# Patient Record
Sex: MALE | URBAN - METROPOLITAN AREA
[De-identification: names, ages, dates, MRNs, and addresses within clinical notes are randomized per-mention and may not be internally consistent; named-entity substitution may affect disease eponyms.]

---

## 2019-02-25 ENCOUNTER — OFFICE VISIT (OUTPATIENT)
Dept: URBAN - METROPOLITAN AREA CLINIC 17 | Facility: CLINIC | Age: 69
End: 2019-02-25
Payer: COMMERCIAL

## 2019-02-25 DIAGNOSIS — H40.1134 PRIMARY OPEN-ANGLE GLAUCOMA, BILATERAL, INDETERMINATE STAGE: Primary | ICD-10-CM

## 2019-02-25 PROCEDURE — 76514 ECHO EXAM OF EYE THICKNESS: CPT | Performed by: OPHTHALMOLOGY

## 2019-02-25 PROCEDURE — 99204 OFFICE O/P NEW MOD 45 MIN: CPT | Performed by: OPHTHALMOLOGY

## 2019-02-25 PROCEDURE — 92133 CPTRZD OPH DX IMG PST SGM ON: CPT | Performed by: OPHTHALMOLOGY

## 2019-02-25 PROCEDURE — 92020 GONIOSCOPY: CPT | Performed by: OPHTHALMOLOGY

## 2019-02-25 ASSESSMENT — INTRAOCULAR PRESSURE
OD: 30
OS: 25

## 2019-02-25 NOTE — IMPRESSION/PLAN
Impression: Primary open-angle glaucoma, bilateral, indeterminate stage: H40.1134. Condition: unstable. IOP elevated OU Plan: Discussed diagnosis, explained and understood by patient. Discussed IOP/ONH/Glaucoma management and risks. start Vyzulta QHS OD and start Lumigan QHS OS (Samples given today). Discussed possibility of laser and surgery to lower IOP if drops do not work. Will continue to monitor condition and symptoms.

## 2019-04-08 ENCOUNTER — OFFICE VISIT (OUTPATIENT)
Dept: URBAN - METROPOLITAN AREA CLINIC 17 | Facility: CLINIC | Age: 69
End: 2019-04-08
Payer: COMMERCIAL

## 2019-04-08 PROCEDURE — 92083 EXTENDED VISUAL FIELD XM: CPT | Performed by: OPHTHALMOLOGY

## 2019-04-08 PROCEDURE — 99213 OFFICE O/P EST LOW 20 MIN: CPT | Performed by: OPHTHALMOLOGY

## 2019-04-08 RX ORDER — LATANOPROSTENE BUNOD 0.24 MG/ML
0.024 % SOLUTION/ DROPS OPHTHALMIC
Qty: 1 | Refills: 10 | Status: INACTIVE
Start: 2019-04-08 | End: 2020-01-06

## 2019-04-08 RX ORDER — BIMATOPROST 0.1 MG/ML
0.01 % SOLUTION/ DROPS OPHTHALMIC
Qty: 0 | Refills: 0 | Status: INACTIVE
Start: 2019-02-25 | End: 2019-04-08

## 2019-04-08 RX ORDER — LATANOPROSTENE BUNOD 0.24 MG/ML
0.024 % SOLUTION/ DROPS OPHTHALMIC
Qty: 0 | Refills: 0 | Status: INACTIVE
Start: 2019-02-25 | End: 2019-04-08

## 2019-04-08 ASSESSMENT — KERATOMETRY
OD: 40.63
OS: 40.50

## 2019-04-08 ASSESSMENT — INTRAOCULAR PRESSURE
OS: 15
OD: 15

## 2019-04-08 NOTE — IMPRESSION/PLAN
Impression: Primary open-angle glaucoma, bilateral, moderate stage: M37.0802. OU.
-IOP doing well ou with glaucoma meds. --ONH stable ou - Plan: Discussed diagnosis, explained and understood by patient. Discussed IOP/ONH/Glaucoma management and risks. visual field ordered and reviewed today. start vyzulta OS qhs. continue vyzulta OD qhs.
discontinue lumigan OS. Will continue to monitor condition and symptoms.

## 2019-08-05 ENCOUNTER — OFFICE VISIT (OUTPATIENT)
Dept: URBAN - METROPOLITAN AREA CLINIC 17 | Facility: CLINIC | Age: 69
End: 2019-08-05
Payer: COMMERCIAL

## 2019-08-05 PROCEDURE — 99213 OFFICE O/P EST LOW 20 MIN: CPT | Performed by: OPHTHALMOLOGY

## 2019-08-05 ASSESSMENT — INTRAOCULAR PRESSURE
OS: 16
OD: 18

## 2019-12-02 ENCOUNTER — OFFICE VISIT (OUTPATIENT)
Dept: URBAN - METROPOLITAN AREA CLINIC 17 | Facility: CLINIC | Age: 69
End: 2019-12-02
Payer: COMMERCIAL

## 2019-12-02 PROCEDURE — 92133 CPTRZD OPH DX IMG PST SGM ON: CPT | Performed by: OPHTHALMOLOGY

## 2019-12-02 PROCEDURE — 99213 OFFICE O/P EST LOW 20 MIN: CPT | Performed by: OPHTHALMOLOGY

## 2019-12-02 RX ORDER — PREDNISOLONE ACETATE 10 MG/ML
1 % SUSPENSION/ DROPS OPHTHALMIC
Qty: 5 | Refills: 0 | Status: INACTIVE
Start: 2019-12-02 | End: 2019-12-02

## 2019-12-02 ASSESSMENT — INTRAOCULAR PRESSURE
OD: 20
OS: 18

## 2019-12-02 NOTE — IMPRESSION/PLAN
Impression: Primary open-angle glaucoma, bilateral, moderate stage: O90.6434. OU.
CCT thin OU ALT OD 8/20/14 ONH stable OU
IOP elevated OU today Plan: Discussed diagnosis, explained and understood by patient. Discussed IOP/ONH/Glaucoma management and risks. OCT ordered and reviewed today. Continue vyzulta ou qhs. Continue to monitor condition and symptoms. Recommend Trabeculoplasty (SLT)  to possibly lower IOP. Patient elects SLT OD. Discussed RBA's of laser trabeculoplasty .  RL=2

## 2019-12-30 ENCOUNTER — SURGERY (OUTPATIENT)
Dept: URBAN - METROPOLITAN AREA SURGERY 7 | Facility: SURGERY | Age: 69
End: 2019-12-30
Payer: COMMERCIAL

## 2019-12-30 PROCEDURE — 65855 TRABECULOPLASTY LASER SURG: CPT | Performed by: OPHTHALMOLOGY

## 2020-01-06 ENCOUNTER — POST-OPERATIVE VISIT (OUTPATIENT)
Dept: URBAN - METROPOLITAN AREA CLINIC 17 | Facility: CLINIC | Age: 70
End: 2020-01-06

## 2020-01-06 DIAGNOSIS — Z09 ENCNTR FOR F/U EXAM AFT TRTMT FOR COND OTH THAN MALIG NEOPLM: Primary | ICD-10-CM

## 2020-01-06 PROCEDURE — 99024 POSTOP FOLLOW-UP VISIT: CPT | Performed by: OPTOMETRIST

## 2020-01-06 RX ORDER — LATANOPROSTENE BUNOD 0.24 MG/ML
0.024 % SOLUTION/ DROPS OPHTHALMIC
Qty: 1 | Refills: 10 | Status: INACTIVE
Start: 2020-01-06 | End: 2020-05-04

## 2020-01-06 ASSESSMENT — INTRAOCULAR PRESSURE
OD: 21
OD: 25
OS: 20

## 2020-03-06 ENCOUNTER — OFFICE VISIT (OUTPATIENT)
Dept: URBAN - METROPOLITAN AREA CLINIC 17 | Facility: CLINIC | Age: 70
End: 2020-03-06
Payer: MEDICARE

## 2020-03-06 PROCEDURE — 92020 GONIOSCOPY: CPT | Performed by: OPHTHALMOLOGY

## 2020-03-06 PROCEDURE — 92014 COMPRE OPH EXAM EST PT 1/>: CPT | Performed by: OPHTHALMOLOGY

## 2020-03-06 ASSESSMENT — INTRAOCULAR PRESSURE
OS: 22
OD: 21

## 2020-03-06 NOTE — IMPRESSION/PLAN
Impression: Primary open-angle glaucoma, bilateral, moderate stage: O54.3131. OU.
ALT OD 8/20/14 Plan: Pt has Glaucoma    Gonio :Open to SS 1+ PG OU         Pachs:486/480      Today's IOP :21/22     Tmax & date :32/29 Target IOP low to mid teens Pt denies Fhx of Glaucoma Right / Left eye is the better seeing eye Last vf OD inferior Step OS Nasal step 4/8/19 C/D: 
OCT:50/52 12/2/19 Pt denies Sulfa Allergy   // Pt denies Lung /Heart dx Pt is currently using :Vyzulta QHS OU, No previous medications used  / Previously used medications : 
Plan :
1. IOP high today. Patient reports difficulty getting medications due to cost. 
2. Poor SLT effect 3 Will change patients medications START Latanoprost QHS OU Dorzolamide BID OU 4. Patient to return in 6-8 weeks for IOP check

## 2020-05-04 ENCOUNTER — OFFICE VISIT (OUTPATIENT)
Dept: URBAN - METROPOLITAN AREA CLINIC 17 | Facility: CLINIC | Age: 70
End: 2020-05-04
Payer: MEDICARE

## 2020-05-04 PROCEDURE — 92012 INTRM OPH EXAM EST PATIENT: CPT | Performed by: OPHTHALMOLOGY

## 2020-05-04 ASSESSMENT — INTRAOCULAR PRESSURE
OS: 18
OD: 18

## 2020-09-21 ENCOUNTER — OFFICE VISIT (OUTPATIENT)
Dept: URBAN - METROPOLITAN AREA CLINIC 17 | Facility: CLINIC | Age: 70
End: 2020-09-21
Payer: MEDICARE

## 2020-09-21 PROCEDURE — 92083 EXTENDED VISUAL FIELD XM: CPT | Performed by: OPHTHALMOLOGY

## 2020-09-21 PROCEDURE — 92012 INTRM OPH EXAM EST PATIENT: CPT | Performed by: OPHTHALMOLOGY

## 2020-09-21 RX ORDER — DORZOLAMIDE HCL 20 MG/ML
2 % SOLUTION/ DROPS OPHTHALMIC
Qty: 3 | Refills: 3 | Status: INACTIVE
Start: 2020-09-21 | End: 2021-10-13

## 2020-09-21 RX ORDER — LATANOPROST 50 UG/ML
0.005 % SOLUTION OPHTHALMIC
Qty: 3 | Refills: 3 | Status: INACTIVE
Start: 2020-09-21 | End: 2021-05-07

## 2020-09-21 ASSESSMENT — INTRAOCULAR PRESSURE
OD: 18
OS: 15

## 2020-09-21 NOTE — IMPRESSION/PLAN
Impression: Primary open-angle glaucoma, bilateral, moderate stage: T32.3865. OU.
ALT OD 8/20/14 Poor SLT effect Plan: Pt has Moderate POAG OU Gonio :Open to SS 1+ PG OU         Pachs:486/480      Today's IOP : 18/15    Tmax & date :32/29 Target IOP low to mid teens Pt denies Fhx of Glaucoma Left eye is the better seeing eye [facility] 09/18/2020 OD: Early nasal step  OS: Central scotoma C/D: .8-.8 deep cup / .6-.6 OCT:50/52 12/2/19 Pt denies Sulfa Allergy   // Pt denies Lung /Heart dx Pt is currently using :Latanoprost QHS OU, Dorzolamide BID OU  (Drops not used today) No previous medications used Plan :
1. Cont:
Latanoprost QHS OU Dorzolamide BID OU 2. Discussed details about Glaucoma and that without proper control of pressures irreversible blindness can occur. Patient understands risks. Emphasize compliance with drop and without compliance vision loss progression can occur.

## 2021-03-08 ENCOUNTER — OFFICE VISIT (OUTPATIENT)
Dept: URBAN - METROPOLITAN AREA CLINIC 17 | Facility: CLINIC | Age: 71
End: 2021-03-08
Payer: MEDICARE

## 2021-03-08 PROCEDURE — 99213 OFFICE O/P EST LOW 20 MIN: CPT | Performed by: OPHTHALMOLOGY

## 2021-03-08 ASSESSMENT — INTRAOCULAR PRESSURE
OS: 24
OD: 28

## 2021-03-08 NOTE — IMPRESSION/PLAN
Impression: Primary open-angle glaucoma, bilateral, moderate stage: X01.6507. OU.
ALT OD 8/20/14 Poor SLT effect Plan: Pt has Moderate POAG OU Gonio :Open to SS 1+ PG OU         Pachs:486/480      Today's IOP : 28/24 (Ran out of drops x 2 weeks)    Tmax & date :32/29 Target IOP low to mid teens Pt denies Fhx of Glaucoma Left eye is the better seeing eye [facility] 09/18/2020 OD: Early nasal step  OS: Central scotoma C/D: .8-.8 deep cup / .6-.7 OCT:50/52 12/2/19 Pt denies Sulfa Allergy   // Pt denies Lung /Heart dx Pt is currently using :Latanoprost QHS OU, Dorzolamide BID OU  (Drops not used today) No previous medications used Plan :
1. Cont: (ran out x 2 weeks) Latanoprost QHS OU Dorzolamide BID OU 2. Discussed details about Glaucoma and that without proper control of pressures irreversible blindness can occur. Patient understands risks. Emphasize compliance with drop and without compliance vision loss progression can occur. 
3. RTC 2 months IOP (with Dr. Justyn Vázquez or Gautam Bonilla)

## 2021-05-03 ENCOUNTER — OFFICE VISIT (OUTPATIENT)
Dept: URBAN - METROPOLITAN AREA CLINIC 17 | Facility: CLINIC | Age: 71
End: 2021-05-03
Payer: MEDICARE

## 2021-05-03 PROCEDURE — 99214 OFFICE O/P EST MOD 30 MIN: CPT | Performed by: OPHTHALMOLOGY

## 2021-05-03 ASSESSMENT — INTRAOCULAR PRESSURE
OD: 27
OS: 19

## 2021-05-03 NOTE — IMPRESSION/PLAN
Impression: Primary open-angle glaucoma, bilateral, moderate stage: X02.2799. OU.
ALT OD 8/20/14 Poor SLT effect Plan: Discussed diagnosis, explained and understood by patient. Discussed IOP/ONH/Glaucoma management and risks. Advised pt IOPs are still elevated and need to get IOP down. IF IOPs do not improve would need to consider Laser or surgery. Start rocklatan QHS OD and Lumigan QHS OS. Continue Dorzolamide BID OU. D/C Latanoprost. Will continue to monitor condition and symptoms.

## 2021-05-07 RX ORDER — NETARSUDIL AND LATANOPROST OPHTHALMIC SOLUTION, 0.02%/0.005% .2; .05 MG/ML; MG/ML
SOLUTION/ DROPS OPHTHALMIC; TOPICAL
Qty: 2.5 | Refills: 11 | Status: INACTIVE
Start: 2021-05-03 | End: 2021-06-14

## 2021-06-14 ENCOUNTER — OFFICE VISIT (OUTPATIENT)
Dept: URBAN - METROPOLITAN AREA CLINIC 17 | Facility: CLINIC | Age: 71
End: 2021-06-14
Payer: MEDICARE

## 2021-06-14 PROCEDURE — 99213 OFFICE O/P EST LOW 20 MIN: CPT | Performed by: OPHTHALMOLOGY

## 2021-06-14 RX ORDER — NETARSUDIL AND LATANOPROST OPHTHALMIC SOLUTION, 0.02%/0.005% .2; .05 MG/ML; MG/ML
SOLUTION/ DROPS OPHTHALMIC; TOPICAL
Qty: 2.5 | Refills: 11 | Status: INACTIVE
Start: 2021-06-14 | End: 2021-12-08

## 2021-06-14 ASSESSMENT — INTRAOCULAR PRESSURE
OD: 21
OS: 19

## 2021-06-14 NOTE — IMPRESSION/PLAN
Impression: Age-related nuclear cataract, bilateral: H25.13. Plan: Cataracts account for the patient's complaints. Discussed all risks, benefits, procedures and recovery. Patient understands changing glasses will not improve vision. Patient desires to have surgery. Recommend cataract/migs.

## 2021-06-14 NOTE — IMPRESSION/PLAN
Impression: Primary open-angle glaucoma, bilateral, moderate stage: S29.8962. OU.
CCT thin OU ALT OD 8/20/14 Poor SLT effect Plan: Discussed diagnosis, explained and understood by patient. Discussed IOP/ONH/Glaucoma management and risks. Continue rocklatan QHS OD and Lumigan QHS OS. Continue Dorzolamide BID OU. Will continue to monitor condition and symptoms.

## 2021-07-12 ENCOUNTER — OFFICE VISIT (OUTPATIENT)
Dept: URBAN - METROPOLITAN AREA CLINIC 17 | Facility: CLINIC | Age: 71
End: 2021-07-12
Payer: MEDICARE

## 2021-07-12 DIAGNOSIS — H40.1132 PRIMARY OPEN-ANGLE GLAUCOMA, BILATERAL, MODERATE STAGE: ICD-10-CM

## 2021-07-12 DIAGNOSIS — H25.813 COMBINED FORMS OF AGE-RELATED CATARACT, BILATERAL: Primary | ICD-10-CM

## 2021-07-12 DIAGNOSIS — H25.812 COMBINED FORMS OF AGE-RELATED CATARACT, LEFT EYE: ICD-10-CM

## 2021-07-12 DIAGNOSIS — H35.372 PUCKERING OF MACULA, LEFT EYE: ICD-10-CM

## 2021-07-12 PROCEDURE — 99214 OFFICE O/P EST MOD 30 MIN: CPT | Performed by: OPHTHALMOLOGY

## 2021-07-12 ASSESSMENT — KERATOMETRY
OD: 40.88
OS: 40.50

## 2021-07-12 ASSESSMENT — VISUAL ACUITY
OD: 20/50
OS: 20/40

## 2021-07-12 ASSESSMENT — INTRAOCULAR PRESSURE
OS: 11
OD: 13

## 2021-07-12 NOTE — IMPRESSION/PLAN
Impression: Combined forms of age-related cataract, bilateral: H25.813. Condition: established, worsening. Symptoms: could improve with surgery. Plan: Cataract accounts for patient's complaints. Reviewed risks, benefits, and procedure. Patient desires surgery, schedule ce/iol w istent inject OD then OS, RL2, standard IOL, distance refractive target, patient is clear for surgery in [place] [id number]. Discussed with patient the possible need for second surgery due to history of LASIK. Recommend Suzy in [place] to help prevent secondary surgery.

## 2021-07-12 NOTE — IMPRESSION/PLAN
Impression: Primary open-angle glaucoma, bilateral, moderate stage: M86.9622. OU.
CCT thin OU ALT OD 8/20/14 Poor SLT effect Plan: Discussed diagnosis, explained and understood by patient. Discussed IStent inject at the time of cataract surgery to help maintain IOPs. Continue rocklatan QHS OD and Lumigan QHS OS. Continue Dorzolamide BID OU. Will continue to monitor condition and symptoms.

## 2021-07-12 NOTE — IMPRESSION/PLAN
Impression: mild Puckering of macula, left eye: H35.372. Plan: Discussed diagnosis in detail with patient. No treatment is required at this time. Will continue to observe condition and or symptoms.

## 2021-07-29 ENCOUNTER — TESTING ONLY (OUTPATIENT)
Dept: URBAN - METROPOLITAN AREA CLINIC 23 | Facility: CLINIC | Age: 71
End: 2021-07-29
Payer: MEDICARE

## 2021-07-29 DIAGNOSIS — H25.13 AGE-RELATED NUCLEAR CATARACT, BILATERAL: Primary | ICD-10-CM

## 2021-07-29 ASSESSMENT — PACHYMETRY
OD: 3.80
OD: 26.39
OS: 3.60
OS: 26.23

## 2021-08-10 ENCOUNTER — SURGERY (OUTPATIENT)
Dept: URBAN - METROPOLITAN AREA SURGERY 7 | Facility: SURGERY | Age: 71
End: 2021-08-10
Payer: MEDICARE

## 2021-08-10 PROCEDURE — 0191T INSERTION OF ANTERIOR SEGMENT AQUEOUS DRAINAGE DEVICE, W/OUT EXTRAOCULAR RESERVO: CPT | Performed by: OPHTHALMOLOGY

## 2021-08-10 PROCEDURE — 66984 XCAPSL CTRC RMVL W/O ECP: CPT | Performed by: OPHTHALMOLOGY

## 2021-08-11 ENCOUNTER — POST-OPERATIVE VISIT (OUTPATIENT)
Dept: URBAN - METROPOLITAN AREA CLINIC 17 | Facility: CLINIC | Age: 71
End: 2021-08-11
Payer: MEDICARE

## 2021-08-11 DIAGNOSIS — Z48.810 ENCOUNTER FOR SURGICAL AFTERCARE FOLLOWING SURGERY ON A SENSE ORGAN: Primary | ICD-10-CM

## 2021-08-11 PROCEDURE — 99024 POSTOP FOLLOW-UP VISIT: CPT | Performed by: OPTOMETRIST

## 2021-08-11 ASSESSMENT — INTRAOCULAR PRESSURE
OS: 22
OD: 41

## 2021-08-11 NOTE — IMPRESSION/PLAN
Impression: S/P Cataract Extraction by phacoemulsification with IOL placement [id number]; iStent Inject 0191T 0376T OD - 1 Day. Encounter for surgical aftercare following surgery on a sense organ  Z48.810. Post operative instructions reviewed - Plan: Return in 1wk --Continue all glauc meds as directed: Latanoprost QHS OS, Rocklatan QHS OD, Dorzol BID OU.  Advised pt importance of compliance w/gtts

## 2021-08-18 ENCOUNTER — POST-OPERATIVE VISIT (OUTPATIENT)
Dept: URBAN - METROPOLITAN AREA CLINIC 17 | Facility: CLINIC | Age: 71
End: 2021-08-18
Payer: MEDICARE

## 2021-08-18 PROCEDURE — 99024 POSTOP FOLLOW-UP VISIT: CPT | Performed by: OPTOMETRIST

## 2021-08-18 ASSESSMENT — INTRAOCULAR PRESSURE
OS: 20
OD: 19

## 2021-08-18 NOTE — IMPRESSION/PLAN
Impression: S/P Cataract Extraction by phacoemulsification with IOL placement [id number]; iStent Inject 0191T 0376T OD - 8 Days. Encounter for surgical aftercare following surgery on a sense organ  Z48.810. Plan: 2nd eye orders --Advised patient to use artificial tears for comfort. --Continue all meds

## 2021-08-23 ENCOUNTER — SURGERY (OUTPATIENT)
Dept: URBAN - METROPOLITAN AREA SURGERY 7 | Facility: SURGERY | Age: 71
End: 2021-08-23
Payer: MEDICARE

## 2021-08-23 PROCEDURE — 66984 XCAPSL CTRC RMVL W/O ECP: CPT | Performed by: OPHTHALMOLOGY

## 2021-08-23 PROCEDURE — 0191T INSERTION OF ANTERIOR SEGMENT AQUEOUS DRAINAGE DEVICE, W/OUT EXTRAOCULAR RESERVO: CPT | Performed by: OPHTHALMOLOGY

## 2021-08-24 ENCOUNTER — POST-OPERATIVE VISIT (OUTPATIENT)
Dept: URBAN - METROPOLITAN AREA CLINIC 17 | Facility: CLINIC | Age: 71
End: 2021-08-24
Payer: MEDICARE

## 2021-08-24 PROCEDURE — 99024 POSTOP FOLLOW-UP VISIT: CPT | Performed by: OPTOMETRIST

## 2021-08-24 ASSESSMENT — INTRAOCULAR PRESSURE
OS: 18
OD: 15

## 2021-08-24 NOTE — IMPRESSION/PLAN
Impression: S/P Cataract Extraction by phacoemulsification/IOL placement/iStent placement; Shunt:  iStent Inject 0191T  0376T OS - 1 Day. Encounter for surgical aftercare following surgery on a sense organ  Z48.810. Plan: 1WK PO2 --Advised patient to use artificial tears for comfort. --Continue all Glaucoma medications. Continue Rocklatan QHS OD, Lumigan QHS OS, Dorzolamide BID OU. 
1 Sample of Rocklatan and Lumigan given.

## 2021-08-31 ENCOUNTER — POST-OPERATIVE VISIT (OUTPATIENT)
Dept: URBAN - METROPOLITAN AREA CLINIC 17 | Facility: CLINIC | Age: 71
End: 2021-08-31
Payer: MEDICARE

## 2021-08-31 DIAGNOSIS — Z96.1 PRESENCE OF INTRAOCULAR LENS: Primary | ICD-10-CM

## 2021-08-31 PROCEDURE — 99024 POSTOP FOLLOW-UP VISIT: CPT | Performed by: OPTOMETRIST

## 2021-08-31 ASSESSMENT — INTRAOCULAR PRESSURE
OD: 11
OS: 9

## 2021-08-31 NOTE — IMPRESSION/PLAN
Impression: S/P Cataract Extraction by phacoemulsification/IOL placement/iStent placement; Shunt:  iStent Inject 0191T  0376T OS - 8 Days. Presence of intraocular lens  Z96.1. Post operative instructions reviewed - Condition is improving - Plan: return in 3 weeks --Continue all glauc meds: dorzol bid ou, lum qhs os, rockl qhs od. --Advised patient to use artificial tears for comfort.

## 2021-09-22 ENCOUNTER — POST-OPERATIVE VISIT (OUTPATIENT)
Dept: URBAN - METROPOLITAN AREA CLINIC 17 | Facility: CLINIC | Age: 71
End: 2021-09-22
Payer: MEDICARE

## 2021-09-22 PROCEDURE — 99024 POSTOP FOLLOW-UP VISIT: CPT | Performed by: OPTOMETRIST

## 2021-09-22 RX ORDER — PREDNISOLONE ACETATE 10 MG/ML
1 % SUSPENSION/ DROPS OPHTHALMIC
Qty: 5 | Refills: 1 | Status: INACTIVE
Start: 2021-09-22 | End: 2021-10-13

## 2021-09-22 ASSESSMENT — INTRAOCULAR PRESSURE
OD: 12
OS: 9

## 2021-09-22 ASSESSMENT — VISUAL ACUITY
OS: 20/25
OD: 20/25

## 2021-09-22 NOTE — IMPRESSION/PLAN
Impression: S/P Cataract Extraction by phacoemulsification/IOL placement/iStent placement; Shunt:  iStent Inject 0191T  0376T OS - 30 Days. Presence of intraocular lens  Z96.1. Plan: Return for 3 months IOP check with Dr. Taz Paulino --Advised patient to use artificial tears for comfort. 
--Continue Dorzolamide BID OU, Lumigan QHS OS, Rocklatan QHS OD
--Start Pred-Ace QID OD x 1 week (ERx'd)

## 2021-10-13 ENCOUNTER — OFFICE VISIT (OUTPATIENT)
Dept: URBAN - METROPOLITAN AREA CLINIC 17 | Facility: CLINIC | Age: 71
End: 2021-10-13
Payer: MEDICARE

## 2021-10-13 PROCEDURE — 92133 CPTRZD OPH DX IMG PST SGM ON: CPT | Performed by: OPHTHALMOLOGY

## 2021-10-13 PROCEDURE — 99214 OFFICE O/P EST MOD 30 MIN: CPT | Performed by: OPHTHALMOLOGY

## 2021-10-13 PROCEDURE — 92083 EXTENDED VISUAL FIELD XM: CPT | Performed by: OPHTHALMOLOGY

## 2021-10-13 RX ORDER — DORZOLAMIDE HCL 20 MG/ML
2 % SOLUTION/ DROPS OPHTHALMIC
Qty: 7.5 | Refills: 11 | Status: INACTIVE
Start: 2021-10-13 | End: 2021-12-08

## 2021-10-13 ASSESSMENT — INTRAOCULAR PRESSURE
OD: 18
OS: 11

## 2021-10-13 NOTE — IMPRESSION/PLAN
Impression: Primary open-angle glaucoma, bilateral, moderate stage: W34.6893. OU.
CCT thin OU ALT OD 8/20/14 Poor SLT effect Plan: Discussed diagnosis, explained and understood by patient. Discussed IOP/ONH/Glaucoma management and risks. Continue rocklatan QHS OD and Lumigan QHS OS. Continue Dorzolamide BID OU. Will continue to monitor condition and symptoms. Discussed adding drops vs laser. Patient elects SLT OD. Recommend Trabeculoplasty (SLT)  to possibly lower IOP. Discussed RBA's of laser trabeculoplasty .  RL=2.

## 2021-11-01 ENCOUNTER — SURGERY (OUTPATIENT)
Dept: URBAN - METROPOLITAN AREA SURGERY 7 | Facility: SURGERY | Age: 71
End: 2021-11-01
Payer: MEDICARE

## 2021-11-01 PROCEDURE — 65855 TRABECULOPLASTY LASER SURG: CPT | Performed by: OPHTHALMOLOGY

## 2021-12-08 ENCOUNTER — OFFICE VISIT (OUTPATIENT)
Dept: URBAN - METROPOLITAN AREA CLINIC 17 | Facility: CLINIC | Age: 71
End: 2021-12-08
Payer: MEDICARE

## 2021-12-08 PROCEDURE — 99213 OFFICE O/P EST LOW 20 MIN: CPT | Performed by: OPHTHALMOLOGY

## 2021-12-08 RX ORDER — DORZOLAMIDE HCL 20 MG/ML
2 % SOLUTION/ DROPS OPHTHALMIC
Qty: 7.5 | Refills: 11 | Status: ACTIVE
Start: 2021-12-08

## 2021-12-08 RX ORDER — BIMATOPROST 0.1 MG/ML
0.01 % SOLUTION/ DROPS OPHTHALMIC
Qty: 2.5 | Refills: 11 | Status: ACTIVE
Start: 2021-12-08

## 2021-12-08 RX ORDER — NETARSUDIL AND LATANOPROST OPHTHALMIC SOLUTION, 0.02%/0.005% .2; .05 MG/ML; MG/ML
SOLUTION/ DROPS OPHTHALMIC; TOPICAL
Qty: 2.5 | Refills: 11 | Status: ACTIVE
Start: 2021-12-08

## 2021-12-08 ASSESSMENT — INTRAOCULAR PRESSURE
OD: 16
OS: 13

## 2021-12-08 NOTE — IMPRESSION/PLAN
Impression: Primary open-angle glaucoma, bilateral, moderate stage: V41.2254. OU. S/p SLT OD (11/1/21) -effective in lowering IOP
CCT thin OU ALT OD 8/20/14 Plan: Discussed diagnosis, explained and understood by patient. Discussed IOP/ONH/Glaucoma management and risks. Continue Rocklatan QHS OD and Lumigan QHS OS, Dorzolamide BID OU. Will continue to monitor condition and symptoms.

## 2022-02-10 ENCOUNTER — OFFICE VISIT (OUTPATIENT)
Dept: URBAN - METROPOLITAN AREA CLINIC 18 | Facility: CLINIC | Age: 72
End: 2022-02-10
Payer: COMMERCIAL

## 2022-02-10 DIAGNOSIS — H52.4 PRESBYOPIA: Primary | ICD-10-CM

## 2022-02-10 PROCEDURE — 92012 INTRM OPH EXAM EST PATIENT: CPT | Performed by: OPTOMETRIST

## 2022-02-10 ASSESSMENT — INTRAOCULAR PRESSURE
OD: 13
OS: 10

## 2022-02-10 ASSESSMENT — VISUAL ACUITY
OD: 20/25
OS: 20/20

## 2022-02-10 NOTE — IMPRESSION/PLAN
Impression: Presbyopia: H52.4. Plan: Finalized [employer]. Patient education on appropriate options of eye glasses. Return to clinic in one year for complete eye exam and refraction.

## 2022-04-18 ENCOUNTER — OFFICE VISIT (OUTPATIENT)
Dept: URBAN - METROPOLITAN AREA CLINIC 17 | Facility: CLINIC | Age: 72
End: 2022-04-18
Payer: MEDICARE

## 2022-04-18 DIAGNOSIS — H40.1132 PRIMARY OPEN-ANGLE GLAUCOMA, BILATERAL, MODERATE STAGE: Primary | ICD-10-CM

## 2022-04-18 PROCEDURE — 99213 OFFICE O/P EST LOW 20 MIN: CPT | Performed by: OPHTHALMOLOGY

## 2022-04-18 ASSESSMENT — INTRAOCULAR PRESSURE
OS: 12
OD: 18

## 2022-04-18 NOTE — IMPRESSION/PLAN
Impression: Primary open-angle glaucoma, bilateral, moderate stage: J36.4809. OU. S/p SLT OD (11/1/21) -effective in lowering IOP
CCT thin OU ALT OD 8/20/14 Plan: Discussed diagnosis, explained and understood by patient. Discussed IOP/ONH/Glaucoma management and risks. Continue Rocklatan QHS OD and Lumigan QHS OS, Dorzolamide BID OU. Pressure was stable today. Will continue to monitor condition and symptoms.

## 2022-09-14 NOTE — IMPRESSION/PLAN
Impression: Primary open-angle glaucoma, bilateral, moderate stage: L04.0410. OU.
-IOP doing well ou with glaucoma meds. --ONH stable ou - Plan: Discussed diagnosis, explained and understood by patient. Discussed IOP/ONH/Glaucoma management and risks. continue vyzulta ou qhs.
 continue to monitor condition and symptoms. negative

## 2022-09-19 ENCOUNTER — OFFICE VISIT (OUTPATIENT)
Dept: URBAN - METROPOLITAN AREA CLINIC 17 | Facility: CLINIC | Age: 72
End: 2022-09-19
Payer: MEDICARE

## 2022-09-19 DIAGNOSIS — H40.1132 PRIMARY OPEN-ANGLE GLAUCOMA, BILATERAL, MODERATE STAGE: Primary | ICD-10-CM

## 2022-09-19 PROCEDURE — 92133 CPTRZD OPH DX IMG PST SGM ON: CPT | Performed by: OPHTHALMOLOGY

## 2022-09-19 PROCEDURE — 99214 OFFICE O/P EST MOD 30 MIN: CPT | Performed by: OPHTHALMOLOGY

## 2022-09-19 PROCEDURE — 92083 EXTENDED VISUAL FIELD XM: CPT | Performed by: OPHTHALMOLOGY

## 2022-09-19 ASSESSMENT — INTRAOCULAR PRESSURE
OD: 16
OS: 12

## 2022-09-19 NOTE — IMPRESSION/PLAN
Impression: Primary open-angle glaucoma, bilateral, moderate stage: K49.4735. OU. S/p SLT OD (11/1/21) -effective in lowering IOP
CCT thin OU ALT OD 8/20/14 Plan: Discussed diagnosis, explained and understood by patient. Discussed IOP/ONH/Glaucoma management and risks. VF and OCT ordered and reviewed today. Continue Rocklatan QHS OD and Lumigan QHS OS, Dorzolamide BID OU. Will continue to monitor condition and symptoms.

## 2023-02-20 ENCOUNTER — OFFICE VISIT (OUTPATIENT)
Dept: URBAN - METROPOLITAN AREA CLINIC 17 | Facility: CLINIC | Age: 73
End: 2023-02-20
Payer: MEDICARE

## 2023-02-20 DIAGNOSIS — H40.1132 PRIMARY OPEN-ANGLE GLAUCOMA, BILATERAL, MODERATE STAGE: Primary | ICD-10-CM

## 2023-02-20 PROCEDURE — 99214 OFFICE O/P EST MOD 30 MIN: CPT | Performed by: OPHTHALMOLOGY

## 2023-02-20 ASSESSMENT — INTRAOCULAR PRESSURE
OS: 11
OD: 15

## 2023-02-20 NOTE — IMPRESSION/PLAN
Impression: Primary open-angle glaucoma, bilateral, moderate stage: Y06.7013. OU. S/p SLT OD (11/1/21) -effective in lowering IOP
CCT thin OU ALT OD 8/20/14 Plan: Discussed diagnosis, explained and understood by patient. Discussed IOP/ONH/Glaucoma management and risks. Discussed durysta in place of lumigan. Continue Rocklatan QHS OD and Lumigan QHS OS, Dorzolamide BID OU. Will continue to monitor condition and symptoms.